# Patient Record
Sex: MALE | Race: WHITE | Employment: FULL TIME | URBAN - METROPOLITAN AREA
[De-identification: names, ages, dates, MRNs, and addresses within clinical notes are randomized per-mention and may not be internally consistent; named-entity substitution may affect disease eponyms.]

---

## 2024-08-13 RX ORDER — NAPROXEN 250 MG/1
250 TABLET ORAL 2 TIMES DAILY WITH MEALS
COMMUNITY

## 2024-08-13 RX ORDER — LEVOTHYROXINE SODIUM 0.15 MG/1
150 TABLET ORAL DAILY
COMMUNITY

## 2024-08-13 RX ORDER — OLMESARTAN MEDOXOMIL 20 MG/1
20 TABLET ORAL DAILY
COMMUNITY

## 2024-08-13 RX ORDER — ALLOPURINOL 100 MG/1
100 TABLET ORAL DAILY
COMMUNITY

## 2024-08-13 RX ORDER — FOLIC ACID 1 MG/1
1 TABLET ORAL DAILY
COMMUNITY

## 2024-08-22 ENCOUNTER — OFFICE VISIT (OUTPATIENT)
Dept: UROLOGY | Age: 59
End: 2024-08-22
Payer: COMMERCIAL

## 2024-08-22 ENCOUNTER — HOSPITAL ENCOUNTER (OUTPATIENT)
Age: 59
Setting detail: SPECIMEN
Discharge: HOME | End: 2024-08-22
Payer: COMMERCIAL

## 2024-08-22 VITALS
DIASTOLIC BLOOD PRESSURE: 102 MMHG | HEART RATE: 80 BPM | TEMPERATURE: 98.6 F | SYSTOLIC BLOOD PRESSURE: 150 MMHG | BODY MASS INDEX: 42 KG/M2 | HEIGHT: 71 IN | WEIGHT: 300 LBS

## 2024-08-22 DIAGNOSIS — R31.29 MICROHEMATURIA: ICD-10-CM

## 2024-08-22 DIAGNOSIS — N13.8 BPH WITH OBSTRUCTION/LOWER URINARY TRACT SYMPTOMS: Primary | ICD-10-CM

## 2024-08-22 DIAGNOSIS — N40.1 BPH WITH OBSTRUCTION/LOWER URINARY TRACT SYMPTOMS: Primary | ICD-10-CM

## 2024-08-22 DIAGNOSIS — N40.1 BPH WITH OBSTRUCTION/LOWER URINARY TRACT SYMPTOMS: ICD-10-CM

## 2024-08-22 DIAGNOSIS — N13.8 BPH WITH OBSTRUCTION/LOWER URINARY TRACT SYMPTOMS: ICD-10-CM

## 2024-08-22 LAB
BILIRUB UR QL STRIP: NEGATIVE
CLARITY UR: CLEAR
COLOR UR: YELLOW
EPI CELLS #/AREA URNS HPF: NORMAL /HPF (ref 0–5)
GLUCOSE UR STRIP-MCNC: NEGATIVE MG/DL
HGB UR QL STRIP.AUTO: ABNORMAL
KETONES UR STRIP-MCNC: NEGATIVE MG/DL
LEUKOCYTE ESTERASE UR QL STRIP: NEGATIVE
NITRITE UR QL STRIP: NEGATIVE
PH UR STRIP: 6 [PH] (ref 5–9)
PROT UR STRIP-MCNC: NEGATIVE MG/DL
RBC #/AREA URNS HPF: NORMAL /HPF (ref 0–2)
SP GR UR STRIP: 1.01 (ref 1.01–1.02)
UROBILINOGEN UR STRIP-ACNC: NORMAL EU/DL (ref 0–1)
WBC #/AREA URNS HPF: NORMAL /HPF (ref 0–5)

## 2024-08-22 PROCEDURE — 99204 OFFICE O/P NEW MOD 45 MIN: CPT | Performed by: UROLOGY

## 2024-08-22 PROCEDURE — 81001 URINALYSIS AUTO W/SCOPE: CPT

## 2024-08-22 PROCEDURE — 87086 URINE CULTURE/COLONY COUNT: CPT

## 2024-08-22 RX ORDER — PREDNISONE 5 MG/1
4 TABLET ORAL DAILY
COMMUNITY
Start: 2024-05-27

## 2024-08-22 NOTE — PROGRESS NOTES
HPI:          Patient is a 58 y.o. male in no acute distress.  He is alert and oriented to person, place, and time.         Patient being seen here today as a new patient.  Patient was referred here by Dr. Anita FIGUEROA.  Patient has been having dysuria that started approximately 3 months ago.  Patient has seen urology approximately 15 years ago.  Patient has urinary weak stream as well as postvoid dribbling.  No current gross hematuria or dysuria.  Patient does have a bowel movement every day.  But he does struggle with this.  Patient did recently take Zyrtec.  This is for COVID and COVID-like symptoms.  These did cause some dysuria and discomfort voiding.  Patient did have a CT scan performed approximately 2 months ago.  This film was independently reviewed.  This did show an enlarged prostate but no other significant  abnormalities.  Patient's main issue with his lower urinary tract symptom is postvoid dribbling.  No pain today.    Past Medical History:   Diagnosis Date    Hashimoto's thyroiditis 2008    Polymyalgia rheumatica (HCC) 11/01/2023     No past surgical history on file.  Outpatient Encounter Medications as of 8/22/2024   Medication Sig Dispense Refill    predniSONE (DELTASONE) 5 MG tablet Take 4 mg by mouth daily      levothyroxine (SYNTHROID) 150 MCG tablet Take 1 tablet by mouth Daily      olmesartan (BENICAR) 20 MG tablet Take 1 tablet by mouth daily      folic acid (FOLVITE) 1 MG tablet Take 1 tablet by mouth daily      naproxen (NAPROSYN) 250 MG tablet Take 1 tablet by mouth 2 times daily (with meals)      methotrexate (TREXALL) 10 MG chemo tablet Take 1 tablet by mouth once a week Sundays- #2 10 mg AM and PM      allopurinol (ZYLOPRIM) 100 MG tablet Take 1 tablet by mouth daily (Patient not taking: Reported on 8/22/2024)       No facility-administered encounter medications on file as of 8/22/2024.      Current Outpatient Medications on File Prior to Visit   Medication Sig Dispense Refill

## 2024-08-24 LAB
MICROORGANISM SPEC CULT: NO GROWTH
SERVICE CMNT-IMP: NORMAL
SPECIMEN DESCRIPTION: NORMAL

## 2024-08-26 ENCOUNTER — TELEPHONE (OUTPATIENT)
Dept: UROLOGY | Age: 59
End: 2024-08-26

## 2024-08-26 NOTE — TELEPHONE ENCOUNTER
----- Message from Rahul Doherty PA-C sent at 8/26/2024  8:25 AM EDT -----  Please let him know urine culture was negative for UTI, there was also no significant blood in the urine    Proceed with bladder scope

## 2024-08-26 NOTE — TELEPHONE ENCOUNTER
----- Message from CARLEY Hunt - CNP sent at 8/26/2024  8:24 AM EDT -----  Call pt - urine cx reviewed and negative for UTI & for significant microhematuria

## 2024-08-26 NOTE — TELEPHONE ENCOUNTER
Writer left message for patient regarding negative results and if she has any questions to please call the office.

## 2024-08-26 NOTE — TELEPHONE ENCOUNTER
Writer talked to patients wife Jackeline in regards of negative results, she voiced understanding.

## 2024-08-29 ENCOUNTER — PROCEDURE VISIT (OUTPATIENT)
Dept: UROLOGY | Age: 59
End: 2024-08-29
Payer: COMMERCIAL

## 2024-08-29 VITALS
SYSTOLIC BLOOD PRESSURE: 136 MMHG | BODY MASS INDEX: 42.12 KG/M2 | DIASTOLIC BLOOD PRESSURE: 80 MMHG | TEMPERATURE: 98.4 F | WEIGHT: 302 LBS

## 2024-08-29 DIAGNOSIS — R31.29 MICROHEMATURIA: ICD-10-CM

## 2024-08-29 DIAGNOSIS — N13.8 BPH WITH OBSTRUCTION/LOWER URINARY TRACT SYMPTOMS: Primary | ICD-10-CM

## 2024-08-29 DIAGNOSIS — N40.1 BPH WITH OBSTRUCTION/LOWER URINARY TRACT SYMPTOMS: Primary | ICD-10-CM

## 2024-08-29 PROCEDURE — 52000 CYSTOURETHROSCOPY: CPT | Performed by: UROLOGY

## 2024-08-29 PROCEDURE — 99214 OFFICE O/P EST MOD 30 MIN: CPT | Performed by: UROLOGY

## 2024-08-29 NOTE — PROGRESS NOTES
HPI:          Patient is a 58 y.o. male in no acute distress.  He is alert and oriented to person, place, and time.         History  Patient being seen here today as a new patient. Patient was referred here by Dr. Anita FIGUEROA. Patient has been having dysuria that started approximately 3 months ago. Patient has seen urology approximately 15 years ago. Patient has urinary weak stream as well as postvoid dribbling. No current gross hematuria or dysuria. Patient does have a bowel movement every day. But he does struggle with this. Patient did recently take Zyrtec. This is for COVID and COVID-like symptoms. These did cause some dysuria and discomfort voiding. Patient did have a CT scan performed approximately 2 months ago. This film was independently reviewed. This did show an enlarged prostate but no other significant  abnormalities. Patient's main issue with his lower urinary tract symptom is postvoid dribbling. No pain today.       Currently  Patient is here today for 1 week follow-up.  This is for microhematuria as well as BPH with lower urinary tract symptoms.  Patient reports no pain today.  At last visit we did offer patient therapy for BPH.  He was going to make a decision on that today.  No current gross hematuria or dysuria.    Cystoscopy Procedure Note    Pre-operative Diagnosis: microhematuria    Post-operative Diagnosis: Same     Surgeon: Julissa    Assistants: None    Anesthesia : Local    Procedure Details   The risks, benefits, complications, treatment options, and expected outcomes were discussed with the patient. The patient concurred with the proposed plan, giving informed consent.    Cystoscopy was performed today under local anesthesia, using sterile technique. The patient was placed in the dorsal lithotomy position, prepped with CHG, and draped in the usual sterile fashion. A 14 Japanese flexible cystoscope was used to systematically inspect both the urethra and bladder in their  methotrexate (TREXALL) 10 MG chemo tablet Take 1 tablet by mouth once a week Sundays- #2 10 mg AM and PM       No current facility-administered medications on file prior to visit.     Patient has no known allergies.  No family history on file.  Social History     Tobacco Use   Smoking Status Former    Types: Cigarettes    Start date: 1981   Smokeless Tobacco Former    Types: Chew    Quit date: 1999       Social History     Substance and Sexual Activity   Alcohol Use Yes    Alcohol/week: 5.0 standard drinks of alcohol    Types: 5 Shots of liquor per week       Review of Systems    There were no vitals taken for this visit.      PHYSICAL EXAM:  Constitutional: Patient in no acute distress;   Neuro: alert and oriented to person place and time.    Psych: Mood and affect normal.  Skin: Normal  Lungs: Respiratory effort normal  Cardiovascular:  Normal peripheral pulses  Abdomen: Soft, non-tender, non-distended with no CVA, flank pain  Bladder non-tender and not distended.  Lymphatics: no palpable lymphadenopathy  Penis normal  Urethral meatus normal  Scrotal exam normal  Testicles normal bilaterally  Epididymis normal bilaterally  No evidence of inguinal hernia      No results found for: \"BUN\"  No results found for: \"CREATININE\"  No results found for: \"PSA\"    ASSESSMENT:  This is a 58 y.o. male with the following diagnoses:   Diagnosis Orders   1. BPH with obstruction/lower urinary tract symptoms        2. Microhematuria             PLAN:  Patient is clear for microscopic hematuria standpoint.  He will follow-up with us in 1 year.  I again offered medication for BPH therapy.  He does not wish to do this.

## 2024-08-29 NOTE — PROGRESS NOTES
During cystoscopy the following was utilized on patient with no adverse affects:    45% SODIUM CHLORIDE 500 ML BAG  Lot number: [id number]  Expiration date: 07/2025      LIDOCAINE HYDROCHLORIDE JELLY 2%   Lot number: [id number]  Expiration date: 02/2026      CYSTOSCOPE   Lot number: [id number]  Expiration date: 10/27/2026